# Patient Record
Sex: FEMALE | Race: BLACK OR AFRICAN AMERICAN | NOT HISPANIC OR LATINO | Employment: FULL TIME | URBAN - METROPOLITAN AREA
[De-identification: names, ages, dates, MRNs, and addresses within clinical notes are randomized per-mention and may not be internally consistent; named-entity substitution may affect disease eponyms.]

---

## 2017-01-03 ENCOUNTER — CLINICAL SUPPORT (OUTPATIENT)
Dept: SMOKING CESSATION | Facility: CLINIC | Age: 57
End: 2017-01-03
Payer: COMMERCIAL

## 2017-01-03 VITALS
HEIGHT: 67 IN | HEART RATE: 89 BPM | SYSTOLIC BLOOD PRESSURE: 128 MMHG | WEIGHT: 215.81 LBS | DIASTOLIC BLOOD PRESSURE: 84 MMHG | BODY MASS INDEX: 33.87 KG/M2

## 2017-01-03 DIAGNOSIS — F17.200 SMOKES AND MOTIVATED TO QUIT: Primary | ICD-10-CM

## 2017-01-03 PROCEDURE — 99404 PREV MED CNSL INDIV APPRX 60: CPT | Mod: S$GLB,,,

## 2017-01-03 RX ORDER — INSULIN DETEMIR 100 [IU]/ML
INJECTION, SOLUTION SUBCUTANEOUS
Refills: 10 | COMMUNITY
Start: 2016-11-17

## 2017-01-03 RX ORDER — LISINOPRIL 40 MG/1
40 TABLET ORAL DAILY
Refills: 3 | COMMUNITY
Start: 2016-12-14

## 2017-01-03 RX ORDER — LORATADINE 10 MG/1
10 TABLET ORAL DAILY
COMMUNITY

## 2017-01-03 RX ORDER — AMLODIPINE BESYLATE 10 MG/1
10 TABLET ORAL DAILY
Refills: 3 | COMMUNITY
Start: 2016-11-10

## 2017-01-03 RX ORDER — CARVEDILOL 25 MG/1
25 TABLET ORAL 2 TIMES DAILY WITH MEALS
Refills: 3 | COMMUNITY
Start: 2016-12-02

## 2017-01-03 RX ORDER — ATORVASTATIN CALCIUM 40 MG/1
40 TABLET, FILM COATED ORAL DAILY
Refills: 3 | COMMUNITY
Start: 2016-12-02

## 2017-01-03 RX ORDER — INSULIN LISPRO 100 [IU]/ML
INJECTION, SOLUTION INTRAVENOUS; SUBCUTANEOUS
Refills: 11 | COMMUNITY
Start: 2016-11-08

## 2017-01-03 RX ORDER — METFORMIN HYDROCHLORIDE 1000 MG/1
1000 TABLET ORAL 2 TIMES DAILY
Refills: 3 | COMMUNITY
Start: 2016-12-14

## 2017-01-03 RX ORDER — ESOMEPRAZOLE MAGNESIUM 40 MG/1
CAPSULE, DELAYED RELEASE ORAL
Refills: 3 | COMMUNITY
Start: 2016-10-21

## 2017-01-03 RX ORDER — VITAMIN E 268 MG
400 CAPSULE ORAL 2 TIMES DAILY
COMMUNITY

## 2017-01-04 RX ORDER — IBUPROFEN 200 MG
1 TABLET ORAL DAILY
Qty: 28 PATCH | Refills: 0 | Status: SHIPPED | OUTPATIENT
Start: 2017-01-04

## 2017-01-04 NOTE — PROGRESS NOTES
1/3/17   See Smoking Cessation Smart Form    Additional Comments:  CESD = 34 suggesting significant depression; patient acknowledged depression, denied unsafe thoughts toward self or others, future oriented, open to and was given resources to initiate mental health services. Has had mental health services in the past but said she can't afford to go to the same place.  One of the factors she said was impacting her mental health was the fact that she socially isolates because of the smell of smoke on her.  She will be participating in the Smoking Cessation Group starting 1/9/17.    Additional Interventions:  · Recommended patient participate in Smoking Cessation Group .  · Discussed triggers and planning for quit date.  · Given patient education handouts from [employer] Tool Kit #3  · Educated patient about and gave patient education handouts from  [employer] Drug Information on: NRT  · Provided phone number to reach [facility] CTTS (Certified Tobacco Treatment Specialist) for future assistance and numbers to 24/7 Quit lines.

## 2017-01-10 ENCOUNTER — CLINICAL SUPPORT (OUTPATIENT)
Dept: SMOKING CESSATION | Facility: CLINIC | Age: 57
End: 2017-01-10
Payer: COMMERCIAL

## 2017-01-10 DIAGNOSIS — F17.210 CIGARETTE SMOKER: Primary | ICD-10-CM

## 2017-01-10 PROCEDURE — 99407 BEHAV CHNG SMOKING > 10 MIN: CPT | Mod: S$GLB,,,

## 2017-01-16 ENCOUNTER — CLINICAL SUPPORT (OUTPATIENT)
Dept: SMOKING CESSATION | Facility: CLINIC | Age: 57
End: 2017-01-16
Payer: COMMERCIAL

## 2017-01-16 DIAGNOSIS — F17.210 CIGARETTE SMOKER: Primary | ICD-10-CM

## 2017-01-16 PROCEDURE — 90853 GROUP PSYCHOTHERAPY: CPT | Mod: S$GLB,,,

## 2017-01-17 NOTE — PROGRESS NOTES
Smoking Cessation Group Session #2    Site: [facility]  Date:  1/16/17  Clinical Status of Patient: Outpatient   Length of Service and Code: 90 minutes - 32365   Number in Attendance: 8  Group Activities/Focus of Group:  Sharing last weeks challenges, triggers, and coping activities to remain quit and/ or keep making progress toward cessation, completion of TCRS (Tobacco Cessation Rating Scale) learned addiction model, personal reasons for quitting, medications, goals, quit date.  Specific session focus: Dependence, withdrawal medications, effects of nicotine on the body, toxins in cigarettes, health risks of smoking, health improvements with quitting, states of mind and effect on urges.  Moving through urges without fighting them or smoking by mindful awareness.  Target symptoms:  withdrawal and medication side effects             The following were rated moderate (3) to severe (4) on TCRS:       Moderate 3: sadness & urges     Severe 4:   none  Patient's Response to Intervention: Active participation, self-disclosure, supportive of group peers.  Progress Toward Goals and Other Mental Status Changes:  Patient cut smoking behavior down by 50% , using nicotine patch and nasal spray.  Remained highly motivated to completely quit and identified a planned quit date.  Diagnosis: F17.210  Plan: Group therapy, individual support/cessation counseling and medication monitoring by CTTS. Medication management by providers.  Return to Clinic: 1 week  Planned Quit Date: 1/20/17

## 2017-01-23 ENCOUNTER — CLINICAL SUPPORT (OUTPATIENT)
Dept: SMOKING CESSATION | Facility: CLINIC | Age: 57
End: 2017-01-23
Payer: COMMERCIAL

## 2017-01-23 DIAGNOSIS — F17.210 CIGARETTE SMOKER: Primary | ICD-10-CM

## 2017-01-23 PROCEDURE — 90853 GROUP PSYCHOTHERAPY: CPT | Mod: S$GLB,,,

## 2017-01-24 RX ORDER — ASPIRIN/CALCIUM CARB/MAGNESIUM 325 MG
TABLET ORAL
Qty: 168 LOZENGE | Refills: 0 | Status: SHIPPED | OUTPATIENT
Start: 2017-01-24

## 2017-01-24 NOTE — PROGRESS NOTES
Smoking Cessation Group Session #3    Site: [facility]  Date:  1/23/17  Clinical Status of Patient: Outpatient   Length of Service and Code: 90 minutes - 12033   Number in Attendance: 5  Group Activities/Focus of Group:  Sharing last weeks challenges, triggers, and coping activities to remain quit and/ or keep making progress toward cessation, completion of TCRS (Tobacco Cessation Rating Scale) learned addiction model, personal reasons for quitting, medications, goals, quit date.  Specific session focus: Breaking the tobacco chain, willpower, medication, body triggers vulnerability factors & action plans.  Target symptoms:  withdrawal and medication side effects             The following were rated moderate (3) to severe (4) on TCRS:       Moderate 3: none     Severe 4:   none  Patient's Response to Intervention: Active participation, self-disclosure, supportive of group peers.  Progress Toward Goals and Other Mental Status Changes:   Still smoking 20 cigarettes/day, using patch and nasal spray.  Nasal spray making her nose sore, requested an Rx of nicotine lozenges.  Patient is reluctant to set a planned quit date.  We processed fear of quitting in group.  Diagnosis: F17.210  Plan: Group therapy, individual support/cessation counseling and medication monitoring by CTTS. Medication management by providers.  Return to Clinic: 1 week  Planned Quit Date: To be determined.

## 2017-01-30 ENCOUNTER — CLINICAL SUPPORT (OUTPATIENT)
Dept: SMOKING CESSATION | Facility: CLINIC | Age: 57
End: 2017-01-30
Payer: COMMERCIAL

## 2017-01-30 DIAGNOSIS — F17.210 CIGARETTE SMOKER: Primary | ICD-10-CM

## 2017-01-30 PROCEDURE — 90853 GROUP PSYCHOTHERAPY: CPT | Mod: S$GLB,,,

## 2017-02-01 NOTE — PROGRESS NOTES
Smoking Cessation Group Session #4    [facility]: [facility]  Date:  1/30/17  Clinical Status of Patient: Outpatient   Length of Service and Code: 90 minutes - 12593   Number in Attendance: 5  Group Activities/Focus of Group:  Sharing last weeks challenges, triggers, and coping activities to remain quit and/ or keep making progress toward cessation, completion of TCRS (Tobacco Cessation Rating Scale) learned addiction model, personal reasons for quitting, medications, goals, quit date.  Specific session focus: Dependence, withdrawal medications, effects of nicotine on the body, toxins in cigarettes, health risks of smoking, health improvements with quitting, states of mind and effect on urges.  Moving through urges without fighting them or smoking by mindful awareness.  Target symptoms:  withdrawal and medication side effects             The following were rated moderate (3) to severe (4) on TCRS:       Moderate 3: none     Severe 4:   noone  Patient's Response to Intervention: Active participation, self-disclosure, supportive of group peers.  Progress Toward Goals and Other Mental Status Changes:  Patient's quit date was yesterday and so far she has remained smoke free.  She shared with the group that it has not been as hard as she thought it was going to be.  She is pleased with her progress so far and mindful of triggers.  Diagnosis: F17.210  Plan: Group therapy, individual support/cessation counseling and medication monitoring by CTTS. Medication management by providers.  Return to Clinic: 1 week  Quit Date: 1/29/17

## 2017-02-06 ENCOUNTER — CLINICAL SUPPORT (OUTPATIENT)
Dept: SMOKING CESSATION | Facility: CLINIC | Age: 57
End: 2017-02-06
Payer: COMMERCIAL

## 2017-02-06 DIAGNOSIS — F17.210 CIGARETTE SMOKER: Primary | ICD-10-CM

## 2017-02-06 PROCEDURE — 90853 GROUP PSYCHOTHERAPY: CPT | Mod: S$GLB,,,

## 2017-02-13 ENCOUNTER — CLINICAL SUPPORT (OUTPATIENT)
Dept: SMOKING CESSATION | Facility: CLINIC | Age: 57
End: 2017-02-13
Payer: COMMERCIAL

## 2017-02-13 DIAGNOSIS — F17.210 CIGARETTE SMOKER: Primary | ICD-10-CM

## 2017-02-13 PROCEDURE — 90853 GROUP PSYCHOTHERAPY: CPT | Mod: S$GLB,,,

## 2017-02-13 NOTE — PROGRESS NOTES
Smoking Cessation Group Session #5    Site: [facility]  Date:  2/6/17  Clinical Status of Patient: Outpatient   Length of Service and Code: 90 minutes - 05559   Number in Attendance: 3  Group Activities/Focus of Group:  Sharing last weeks challenges, triggers, and coping activities to remain quit and/ or keep making progress toward cessation, completion of TCRS (Tobacco Cessation Rating Scale) learned addiction model, personal reasons for quitting, medications, goals, quit date.  Specific session focus: Triggers, tests, teachers related to the mind, managing Emotions and making action plans.  Target symptoms:  withdrawal and medication side effects             The following were rated moderate (3) to severe (4) on TCRS:       Moderate 3: none     Severe 4:   none  Patient's Response to Intervention: Active participation, self-disclosure, supportive of group peers.  Progress Toward Goals and Other Mental Status Changes: Patient has remained smoke free since her quit date without slips.  She verbalized to the group high motivation and intent to remain quit.  She is only using the nasal spray at this time.  Diagnosis: F17.210  Plan: Group therapy, individual support/cessation counseling and medication monitoring by CTTS. Medication management by providers.  Return to Clinic: 1 week  Quit Date: 1/29/17

## 2017-02-15 NOTE — PROGRESS NOTES
Smoking Cessation Group Session #6    [facility]: [facility]  Date:  2/13/17  Clinical Status of Patient: Outpatient   Length of Service and Code: 90 minutes - 31657   Number in Attendance: 4  Group Activities/Focus of Group:  Sharing last weeks challenges, triggers, and coping activities to remain quit and/ or keep making progress toward cessation, completion of TCRS (Tobacco Cessation Rating Scale) learned addiction model, personal reasons for quitting, medications, goals, quit date.  Specific session focus: Environmental triggers and managing environmental risks, using effective relationship strategies, relapse prevention & tips for getting back on track after a slip or relapse, wrap up summary of skills for maintaining quit.  Target symptoms:  withdrawal and medication side effects             The following were rated moderate (3) to severe (4) on TCRS:       Moderate 3: none     Severe 4:   none  Patient's Response to Intervention: Active participation, self-disclosure, supportive of group peers.  Progress Toward Goals and Other Mental Status Changes: Patient has remained smoke free since quit date.  Expressed high motivation and determination to remain quit.  Diagnosis: F17.210  Plan:           Use smoking cessation aids as prescribed,   Call 24 hour quit lines and CTTS as needed for support of continued cessation.   Follow through with plan to move through triggers and urges without smoking.   Maintain regular follow up with [facility].  Return to Clinic: 3/6/17  Quit Date:  1/29/17

## 2017-02-27 ENCOUNTER — TELEPHONE (OUTPATIENT)
Dept: SMOKING CESSATION | Facility: CLINIC | Age: 57
End: 2017-02-27

## 2017-02-27 NOTE — TELEPHONE ENCOUNTER
2/27/17   2:40 a.m.    Telephone call to patient to follow up on smoking cessation progress and intent to return to Smoking Cessation Group.  Left a voice mail message for a return call.

## 2017-05-24 ENCOUNTER — TELEPHONE (OUTPATIENT)
Dept: SMOKING CESSATION | Facility: CLINIC | Age: 57
End: 2017-05-24

## 2017-05-26 ENCOUNTER — TELEPHONE (OUTPATIENT)
Dept: SMOKING CESSATION | Facility: CLINIC | Age: 57
End: 2017-05-26

## 2017-05-31 ENCOUNTER — TELEPHONE (OUTPATIENT)
Dept: SMOKING CESSATION | Facility: CLINIC | Age: 57
End: 2017-05-31

## 2018-01-02 ENCOUNTER — TELEPHONE (OUTPATIENT)
Dept: SMOKING CESSATION | Facility: CLINIC | Age: 58
End: 2018-01-02

## 2018-01-15 ENCOUNTER — TELEPHONE (OUTPATIENT)
Dept: SMOKING CESSATION | Facility: CLINIC | Age: 58
End: 2018-01-15

## 2018-01-30 ENCOUNTER — TELEPHONE (OUTPATIENT)
Dept: SMOKING CESSATION | Facility: CLINIC | Age: 58
End: 2018-01-30

## 2018-03-27 ENCOUNTER — TELEPHONE (OUTPATIENT)
Dept: SMOKING CESSATION | Facility: CLINIC | Age: 58
End: 2018-03-27

## 2022-10-31 DIAGNOSIS — M25.552 LEFT HIP PAIN: Primary | ICD-10-CM

## 2022-11-01 ENCOUNTER — HOSPITAL ENCOUNTER (OUTPATIENT)
Dept: RADIOLOGY | Facility: HOSPITAL | Age: 62
Discharge: HOME | End: 2022-11-01
Attending: PHYSICIAN ASSISTANT
Payer: MEDICAID

## 2022-11-01 ENCOUNTER — OFFICE VISIT (OUTPATIENT)
Dept: ORTHOPEDICS | Facility: CLINIC | Age: 62
End: 2022-11-01
Payer: MEDICAID

## 2022-11-01 VITALS
BODY MASS INDEX: 34.05 KG/M2 | HEART RATE: 93 BPM | DIASTOLIC BLOOD PRESSURE: 77 MMHG | WEIGHT: 216.94 LBS | HEIGHT: 67 IN | SYSTOLIC BLOOD PRESSURE: 147 MMHG

## 2022-11-01 DIAGNOSIS — M70.62 TROCHANTERIC BURSITIS OF LEFT HIP: Primary | ICD-10-CM

## 2022-11-01 DIAGNOSIS — M25.552 LEFT HIP PAIN: ICD-10-CM

## 2022-11-01 DIAGNOSIS — M54.32 SCIATICA OF LEFT SIDE: ICD-10-CM

## 2022-11-01 PROCEDURE — 99204 PR OFFICE/OUTPT VISIT, NEW, LEVL IV, 45-59 MIN: ICD-10-PCS | Mod: 25,S$PBB,, | Performed by: PHYSICIAN ASSISTANT

## 2022-11-01 PROCEDURE — 73502 X-RAY EXAM HIP UNI 2-3 VIEWS: CPT | Mod: TC,FY,LT

## 2022-11-01 PROCEDURE — 20610 DRAIN/INJ JOINT/BURSA W/O US: CPT | Mod: PBBFAC,PN | Performed by: PHYSICIAN ASSISTANT

## 2022-11-01 PROCEDURE — 73502 XR HIP WITH PELVIS WHEN PERFORMED, 2 OR 3 VIEWS LEFT: ICD-10-PCS | Mod: 26,LT,, | Performed by: RADIOLOGY

## 2022-11-01 PROCEDURE — 4010F PR ACE/ARB THEARPY RXD/TAKEN: ICD-10-PCS | Mod: CPTII,,, | Performed by: PHYSICIAN ASSISTANT

## 2022-11-01 PROCEDURE — 99999 PR PBB SHADOW E&M-EST. PATIENT-LVL IV: CPT | Mod: PBBFAC,,, | Performed by: PHYSICIAN ASSISTANT

## 2022-11-01 PROCEDURE — 3078F PR MOST RECENT DIASTOLIC BLOOD PRESSURE < 80 MM HG: ICD-10-PCS | Mod: CPTII,,, | Performed by: PHYSICIAN ASSISTANT

## 2022-11-01 PROCEDURE — 3008F PR BODY MASS INDEX (BMI) DOCUMENTED: ICD-10-PCS | Mod: CPTII,,, | Performed by: PHYSICIAN ASSISTANT

## 2022-11-01 PROCEDURE — 1159F MED LIST DOCD IN RCRD: CPT | Mod: CPTII,,, | Performed by: PHYSICIAN ASSISTANT

## 2022-11-01 PROCEDURE — 3078F DIAST BP <80 MM HG: CPT | Mod: CPTII,,, | Performed by: PHYSICIAN ASSISTANT

## 2022-11-01 PROCEDURE — 3077F SYST BP >= 140 MM HG: CPT | Mod: CPTII,,, | Performed by: PHYSICIAN ASSISTANT

## 2022-11-01 PROCEDURE — 73502 X-RAY EXAM HIP UNI 2-3 VIEWS: CPT | Mod: 26,LT,, | Performed by: RADIOLOGY

## 2022-11-01 PROCEDURE — 4010F ACE/ARB THERAPY RXD/TAKEN: CPT | Mod: CPTII,,, | Performed by: PHYSICIAN ASSISTANT

## 2022-11-01 PROCEDURE — 99204 OFFICE O/P NEW MOD 45 MIN: CPT | Mod: 25,S$PBB,, | Performed by: PHYSICIAN ASSISTANT

## 2022-11-01 PROCEDURE — 20610 LARGE JOINT ASPIRATION/INJECTION: L GREATER TROCHANTERIC BURSA: ICD-10-PCS | Mod: S$PBB,LT,, | Performed by: PHYSICIAN ASSISTANT

## 2022-11-01 PROCEDURE — 99214 OFFICE O/P EST MOD 30 MIN: CPT | Mod: PBBFAC,PN | Performed by: PHYSICIAN ASSISTANT

## 2022-11-01 PROCEDURE — 99999 PR PBB SHADOW E&M-EST. PATIENT-LVL IV: ICD-10-PCS | Mod: PBBFAC,,, | Performed by: PHYSICIAN ASSISTANT

## 2022-11-01 PROCEDURE — 3008F BODY MASS INDEX DOCD: CPT | Mod: CPTII,,, | Performed by: PHYSICIAN ASSISTANT

## 2022-11-01 PROCEDURE — 1159F PR MEDICATION LIST DOCUMENTED IN MEDICAL RECORD: ICD-10-PCS | Mod: CPTII,,, | Performed by: PHYSICIAN ASSISTANT

## 2022-11-01 PROCEDURE — 3077F PR MOST RECENT SYSTOLIC BLOOD PRESSURE >= 140 MM HG: ICD-10-PCS | Mod: CPTII,,, | Performed by: PHYSICIAN ASSISTANT

## 2022-11-01 PROCEDURE — 20610 DRAIN/INJ JOINT/BURSA W/O US: CPT | Mod: S$PBB,LT,, | Performed by: PHYSICIAN ASSISTANT

## 2022-11-01 RX ORDER — TRIAMCINOLONE ACETONIDE 40 MG/ML
40 INJECTION, SUSPENSION INTRA-ARTICULAR; INTRAMUSCULAR
Status: DISCONTINUED | OUTPATIENT
Start: 2022-11-01 | End: 2022-11-01 | Stop reason: HOSPADM

## 2022-11-01 RX ADMIN — TRIAMCINOLONE ACETONIDE 40 MG: 40 INJECTION, SUSPENSION INTRA-ARTICULAR; INTRAMUSCULAR at 01:11

## 2022-11-01 NOTE — PROGRESS NOTES
"Subjective:      Patient ID: Isamar Ibrahim is a 62 y.o. female.    Chief Complaint: Pain of the Left Hip      61yo obese female with DM2  presents with many year history of left hip and buttocks pain. Has history of sciatica. She has constant lateral hip pain. Her buttocks pain is occasionally but is associated with sharp, shooting pain down the leg with burning/numbness sensation. This pain "comes and goes." Worse with prolonged ambulation. Notes change in her balance due to her symptoms. Notes instability in her leg. Taking tylenol as needed. Has completed PT two years ago.      Review of Systems   Constitutional: Negative for chills and fever.   Cardiovascular:  Negative for chest pain.   Respiratory:  Negative for cough.    Hematologic/Lymphatic: Does not bruise/bleed easily.   Skin:  Negative for poor wound healing and rash.   Musculoskeletal:  Positive for joint pain, myalgias and stiffness. Negative for back pain.   Gastrointestinal:  Negative for abdominal pain.   Genitourinary:  Negative for bladder incontinence.   Neurological:  Negative for dizziness, loss of balance and weakness.   Psychiatric/Behavioral:  Negative for altered mental status.      Review of patient's allergies indicates:   Allergen Reactions    Penicillins Shortness Of Breath        Current Outpatient Medications   Medication Sig Dispense Refill    amlodipine (NORVASC) 10 MG tablet Take 10 mg by mouth once daily.  3    atorvastatin (LIPITOR) 40 MG tablet Take 40 mg by mouth once daily.  3    CALCIUM-MAGNESIUM-ZINC ORAL Take by mouth.      carvedilol (COREG) 25 MG tablet Take 25 mg by mouth 2 (two) times daily with meals.  3    HUMALOG 100 unit/mL injection INJECT 5 UNITS SC BID BEFORE MEALS  11    LACTOBACILLUS ACIDOPHILUS (PROBIOTIC ORAL) Take 1 capsule by mouth once daily.      LEVEMIR FLEXTOUCH 100 unit/mL (3 mL) InPn pen INJECT 70 UNITS SC AT NIGHT  10    lisinopril (PRINIVIL,ZESTRIL) 40 MG tablet Take 40 mg by mouth once daily.  " "3    loratadine (CLARITIN) 10 mg tablet Take 10 mg by mouth once daily.      metformin (GLUCOPHAGE) 1000 MG tablet Take 1,000 mg by mouth 2 (two) times daily.  3    NEXIUM 40 mg capsule TK 1 C PO QAM B ABEBE  3    nicotine (NICODERM CQ) 21 mg/24 hr Place 1 patch onto the skin once daily. 28 patch 0    nicotine 10 mg/mL Spry One dose (one spray to each nostril) as needed , max 5 in 1 hour period. 40 mL 0    nicotine polacrilex 4 MG Lozg Nicorette Lozenge -One 4 mg lozenge by mouth as needed, max 5 in 6 hour period. 168 lozenge 0    vitamin E 400 UNIT capsule Take 400 Units by mouth 2 (two) times daily.       No current facility-administered medications for this visit.        The patient's relevant past medical, surgical, and social history was reviewed in Epic.       Objective:      VITAL SIGNS: BP (!) 147/77   Pulse 93   Ht 5' 7" (1.702 m)   Wt 98.4 kg (216 lb 14.9 oz)   BMI 33.98 kg/m²     General    Nursing note and vitals reviewed.  Constitutional: She is oriented to person, place, and time. She appears well-developed and well-nourished.   Neurological: She is alert and oriented to person, place, and time.     General Musculoskeletal Exam   Gait: antalgic     Left Hip Exam     Tenderness   The patient tender to palpation of the trochanteric bursa.    Range of Motion   Abduction:  normal   Adduction:  normal   Extension:  normal   Flexion:  normal   External rotation:  normal   Internal rotation: normal     Tests   Pain w/ forced internal rotation (CRYSTAL): absent  Pain w/ forced external rotation (FADIR): absent  Stinchfield test: negative    Other   Sensation: normal    Comments:  Ttp deep buttocks. Negative SLR           Muscle Strength   Left Lower Extremity   Hip Abduction: 5/5   Hip Adduction: 5/5   Hip Flexion: 5/5   Ankle Dorsiflexion:  5/5     Vascular Exam       Left Pulses  Dorsalis Pedis:      2+  Posterior Tibial:      1+     X-Ray Hip 2 or 3 views Left (with Pelvis when performed)  Narrative: " EXAMINATION:  XR HIP WITH PELVIS WHEN PERFORMED, 2 OR 3 VIEWS LEFT    CLINICAL HISTORY:  Pain in left hip    TECHNIQUE:  XR HIP WITH PELVIS WHEN PERFORMED, 2 OR 3 VIEWS LEFT    COMPARISON:  None    FINDINGS:  No bone, joint, or soft tissue abnormality is seen.  Impression: Normal    Electronically signed by: Mahamed Smith Jr  Date:    11/01/2022  Time:    15:29    I have reviewed the above radiograph and agree with the findings stated by the radiologist.           Assessment:       1. Trochanteric bursitis of left hip    2. Sciatica of left side          Plan:         Isamar was seen today for pain.    Diagnoses and all orders for this visit:    Trochanteric bursitis of left hip  -     Ambulatory referral/consult to Physical/Occupational Therapy; Future  -     Large Joint Aspiration/Injection: L greater trochanteric bursa    Sciatica of left side  -     Ambulatory referral/consult to Physical/Occupational Therapy; Future    Left hip greater troch bursitis as well as chronic left sided sciatica. Explained to the patient her symptoms are multi factorial. The constant pain is due to the bursitis. The occasional shooting pain is due to chronic sciatica. I will inject the left greater troch bursa today to help with that pain. Will refer to PT for bursitis and sciatica. If sciatica pain does not resolve or improve, explained she will need to follow up with a spine specialist. She understands and agrees with this plan.      Diagnoses and plan discussed with the patient, as well as the expected course and duration of his symptoms.  All questions and concerns were addressed prior to the end of the visit.   Instructed patient to call office if they have any future questions/concerns or to schedule apt. Patient will return to see me if symptoms worsen or fail to improve    Note dictated with voice recognition software, please excuse any grammatical errors.        Roxie Tanner PA-C   11/01/2022

## 2022-11-01 NOTE — PROCEDURES
Large Joint Aspiration/Injection: L greater trochanteric bursa    Date/Time: 11/1/2022 1:45 PM  Performed by: Roxie Tanner PA-C  Authorized by: Roxie Tanner PA-C     Consent Done?:  Yes (Verbal)  Indications:  Pain  Site marked: the procedure site was marked    Timeout: prior to procedure the correct patient, procedure, and site was verified    Prep: patient was prepped and draped in usual sterile fashion      Local anesthesia used?: Yes    Local anesthetic:  Topical anesthetic    Details:  Needle Size:  22 G  Approach:  Lateral  Location:  Hip  Site:  L greater trochanteric bursa  Medications:  40 mg triamcinolone acetonide 40 mg/mL  Patient tolerance:  Patient tolerated the procedure well with no immediate complications

## 2022-11-03 ENCOUNTER — CLINICAL SUPPORT (OUTPATIENT)
Dept: REHABILITATION | Facility: HOSPITAL | Age: 62
End: 2022-11-03
Payer: MEDICAID

## 2022-11-03 DIAGNOSIS — M54.32 SCIATICA OF LEFT SIDE: ICD-10-CM

## 2022-11-03 DIAGNOSIS — G89.29 CHRONIC HIP PAIN, BILATERAL: ICD-10-CM

## 2022-11-03 DIAGNOSIS — G89.29 CHRONIC BILATERAL LOW BACK PAIN WITHOUT SCIATICA: ICD-10-CM

## 2022-11-03 DIAGNOSIS — M25.551 CHRONIC HIP PAIN, BILATERAL: ICD-10-CM

## 2022-11-03 DIAGNOSIS — M70.62 TROCHANTERIC BURSITIS OF LEFT HIP: ICD-10-CM

## 2022-11-03 DIAGNOSIS — R53.1 DECREASED STRENGTH: ICD-10-CM

## 2022-11-03 DIAGNOSIS — M54.50 CHRONIC BILATERAL LOW BACK PAIN WITHOUT SCIATICA: ICD-10-CM

## 2022-11-03 DIAGNOSIS — M25.552 CHRONIC HIP PAIN, BILATERAL: ICD-10-CM

## 2022-11-03 PROCEDURE — 97110 THERAPEUTIC EXERCISES: CPT

## 2022-11-03 PROCEDURE — 97161 PT EVAL LOW COMPLEX 20 MIN: CPT

## 2022-11-03 NOTE — PLAN OF CARE
[facility]   Physical Therapy Initial Evaluation     Date: 11/3/2022   Name: Isamar Ibrahim  Clinic Number: [id number]    Therapy Diagnosis: No diagnosis found.  Physician: Roxie Tanner PA-C    Physician Orders: PT Eval and Treat   Medical Diagnosis from Referral:   M70.62 (ICD-10-CM) - Trochanteric bursitis of left hip   M54.32 (ICD-10-CM) - Sciatica of left side     Evaluation Date: 11/3/2022  Authorization Period Expiration: 11/01/23  Plan of Care Expiration: 3/1/23  Progress Note Due: 12/3/22  Visit # / Visits authorized: 1/ 1   FOTO: 60% limitation  FOTO 1st Follow Up:  FOTO 2nd Follow Up:      Precautions: Standard, Diabetes, and Fall     Time In: 9:17  Time Out: 10:00  Total Appointment Time (timed & untimed codes): 43 minutes      SUBJECTIVE     Date of onset: 10 years    History of current condition - Isamar reports: having chronic otto hip pain that started 10 years ago. She reported the pain is worse on left and mostly affect her with ambulating and cant go further than 1 block. She also reports getting numbness or tingling in otto legs on the outside. She had previous history of low back pain and scaitica that went down into both legs into ankles. She received an injection yesterday and has been feeling so much better. She has been able to walk better and not have any pain with ambulating. She still is getting some pain on the outside of her hips during sitting. Does report some numbness into anterior hip currently.     Falls: 2 months ago, usually on left hip    Imaging, x-ray - FINDINGS:  No bone, joint, or soft tissue abnormality is seen.    Prior Therapy: none  Occupation: retired  Prior Level of Function: independent  Current Level of Function: pain with ambulation, standing for prolonged periods.     Pain:  Current 4/10, worst 10/10, best 0/10   Location: left and bilateral back , groin , and hips    Description: Aching, Dull, and Numb  Aggravating Factors: Sitting,  Standing, and Walking  Easing Factors: pain medication, ice, and rest    Patients goals: be able to walk without pain, ADL's and everyday activities.      Medical History:   Past Medical History:   Diagnosis Date    Allergy     Environmental    Anxiety     2007    Cataract     Starting in both eyes    Diabetes mellitus, type 2     Disorder of kidney and ureter     hx of kidney stones    GERD (gastroesophageal reflux disease)     Heart murmur     Hypertension     Thyroid nodule        Surgical History:   Isamar Ibrahim  has a past surgical history that includes Kidney stone surgery; Hysterectomy (03/1979); Tonsillectomy (1983); and Breast surgery (10/1988).    Medications:   Isamar has a current medication list which includes the following prescription(s): amlodipine, atorvastatin, calcium/magnesium/zinc, carvedilol, humalog u-100 insulin, lactobacillus acidophilus, levemir flextouch u-100 insuln, lisinopril, loratadine, metformin, nexium, nicotine, nicotine, nicotine polacrilex, and vitamin e.    Allergies:   Review of patient's allergies indicates:   Allergen Reactions    Penicillins Shortness Of Breath          OBJECTIVE     Observation: pt was pleasant throughout treatment session. Ambulation with left antalgic gait, decreased hip ext left>right.     Posture:  right pelvis elevation. Increased lumbar lordosis, increased thoracic kyphosis.     Lumbar Range of Motion:    Limitations Pain   Flexion 15%   none     Extension 40%   none     Left Side Bending 25% Some on left side     Right Side Bending 25% Some on left side         Hip Passive range of motion:   Hip Right Left   Flex 100 100   Ext -5 (lacking)* 0   IR 25 15   ER 70 65         Lower Extremity Strength  Right LE  Left LE    Quadriceps: 4/5 Quadriceps: 4/5   Hamstrings: NT Hamstrings: NT   Iliospoas: NT Iliospoas: NT   Hip extension:  3-/5* Hip extension: 3-/5   HIP abd: 3+/5 Hip abd: 3-/5   Hip ER:  3+/5 Hip ER: 3+/5   Hip IR: 4/5 Hip IR: 4-/5  "  Ankle dorsiflexion: NT Ankle dorsiflexion: NT   Ankle plantarflexion: NT Ankle plantarflexion: NT     Sensation: intact light touch otto LE    Reflexes: NT due to time  -Patellar (L3-L4):   -Achilles (S1):     Special Tests:  -SLR Test: negative  -Repeated Flexion: NT  -Repeated Ext: NT  -Prone Instability Test: NT  -Bridge Test: nt  -Slump Test: NT    SI Special Tests:   Distraction: positive  Compression: negative  SI thigh thrust: NT  Sacral thrust: positive  Flick test: NT  Fortins sign: positive on right   Supine to site: right long to short    Joint Mobility: decreased left hip mobility   -Shear testing:  -Segmental mobility testing:    Palpation: TTP lateral hip, PSIS right>left         Limitation/Restriction for FOTO hip Survey    Therapist reviewed FOTO scores for Isamar Ibrahim on 11/3/2022.   FOTO documents entered into [employer] - see Media section.    Limitation Score: 60%         TREATMENT     Total Treatment time (time-based codes) separate from Evaluation: 14 minutes      Isamar received the treatments listed below:      therapeutic exercises to develop strength and endurance for 9 minutes including:  Sidelying hip ER 2x15 each  Glute sets 10x10"  Supine clams with red TB 10x10"    manual therapy techniques: Joint mobilizations were applied to the: hip, SI joint for 5 minutes, including:  Left hip long axis manipulation  SI gapping manipulation on right       PATIENT EDUCATION AND HOME EXERCISES     Education provided:   - HEP  - importance of hip strengthening    Written Home Exercises Provided: yes. Exercises were reviewed and Isamar was able to demonstrate them prior to the end of the session.  Isamar demonstrated fair understanding of the education provided. See EMR under Patient Instructions for exercises provided during therapy sessions.    ASSESSMENT     Isamar is a 62 y.o. female referred to outpatient Physical Therapy with a medical diagnosis of Trochanteric bursitis of left hip. Patient " presents with decreased hip range of motion, decreased LE strengthening, positive SI joint motion testing, abnormal gait, and pain that is affecting everyday activities. She is appropriate for skilled PT interventions to address deficits listed and improve overall functional mobility.     Patient prognosis is Good.   Patient will benefit from skilled outpatient Physical Therapy to address the deficits stated above and in the chart below, provide patient /family education, and to maximize patientt's level of independence.     Plan of care discussed with patient: Yes  Patient's spiritual, cultural and educational needs considered and patient is agreeable to the plan of care and goals as stated below:     Anticipated Barriers for therapy: none at this time    Medical Necessity is demonstrated by the following  History  Co-morbidities and personal factors that may impact the plan of care Co-morbidities:   advanced age, anxiety, and high BMI  Diabetes mellitus, type 2    Disorder of kidney and ureter    hx of kidney stones   GERD (gastroesophageal reflux disease)    Heart murmur    Hypertension      Personal Factors:   lifestyle     high   Examination  Body Structures and Functions, activity limitations and participation restrictions that may impact the plan of care Body Regions:   back  lower extremities    Body Systems:    gross symmetry  ROM  strength  gross coordinated movement  motor control  motor learning    Participation Restrictions:   None at this time    Activity limitations:   Learning and applying knowledge  no deficits    General Tasks and Commands  no deficits    Communication  no deficits    Mobility  walking    Self care  no deficits    Domestic Life  no deficits    Interactions/Relationships  no deficits    Life Areas  no deficits    Community and Social Life  no deficits         high   Clinical Presentation stable and uncomplicated low   Decision Making/ Complexity Score: low     GOALS: Short Term  Goals:  4-6 weeks  1.Report decreased hip pain  < / =  3/10  to increase tolerance for ambulation  2. Increase ROM by 5-10 degrees where limited in order to perform ADLs without difficulty.  3. Increase strength by 1/3 MMT grade in hip  to increase tolerance for ADL and work activities.  4. Pt to tolerate HEP to improve ROM and independence with ADL's    Long Term Goals: 8-12 weeks  1.Report decreased hip pain < / = 2/10  to increase tolerance for ambulation  2.Patient goal: be able to ambulate long distances without pain  3.Increase strength to 4/5 in  hip  to increase tolerance for ADL and work activities.  4. Pt will report at CJ level (20-40% impaired) on LEFS  to demonstrate increase in LE function with every day tasks.       PLAN   Plan of care Certification: 11/3/2022 to 3/1/23.    Outpatient Physical Therapy 1-2 times weekly for 12 weeks to include the following interventions: Gait Training, Manual Therapy, Moist Heat/ Ice, Neuromuscular Re-ed, Patient Education, Self Care, Therapeutic Activities, and Therapeutic Exercise.     Mayito Birmingham, PT      I CERTIFY THE NEED FOR THESE SERVICES FURNISHED UNDER THIS PLAN OF TREATMENT AND WHILE UNDER MY CARE   Physician's comments:     Physician's Signature: ___________________________________________________

## 2022-11-22 ENCOUNTER — CLINICAL SUPPORT (OUTPATIENT)
Dept: REHABILITATION | Facility: HOSPITAL | Age: 62
End: 2022-11-22
Payer: MEDICAID

## 2022-11-22 DIAGNOSIS — M54.50 CHRONIC BILATERAL LOW BACK PAIN WITHOUT SCIATICA: ICD-10-CM

## 2022-11-22 DIAGNOSIS — M25.551 CHRONIC HIP PAIN, BILATERAL: ICD-10-CM

## 2022-11-22 DIAGNOSIS — G89.29 CHRONIC HIP PAIN, BILATERAL: ICD-10-CM

## 2022-11-22 DIAGNOSIS — R53.1 DECREASED STRENGTH: Primary | ICD-10-CM

## 2022-11-22 DIAGNOSIS — M25.552 CHRONIC HIP PAIN, BILATERAL: ICD-10-CM

## 2022-11-22 DIAGNOSIS — G89.29 CHRONIC BILATERAL LOW BACK PAIN WITHOUT SCIATICA: ICD-10-CM

## 2022-11-22 PROCEDURE — 97110 THERAPEUTIC EXERCISES: CPT

## 2022-11-22 NOTE — PROGRESS NOTES
"    Physical Therapy Daily Treatment Note     Name: Isamar Ibrahim  Clinic Number: [id number]    Therapy Diagnosis: No diagnosis found.  Physician: Roxie Tanner PA-C    Visit Date: 11/22/2022  Physician Orders: PT Eval and Treat   Medical Diagnosis from Referral:   M70.62 (ICD-10-CM) - Trochanteric bursitis of left hip   M54.32 (ICD-10-CM) - Sciatica of left side      Evaluation Date: 11/3/2022  Authorization Period Expiration: 11/01/23  Plan of Care Expiration: 3/1/23  Progress Note Due: 12/3/22  Visit # / Visits authorized: 1/ 12  FOTO: 60% limitation  FOTO 1st Follow Up:  FOTO 2nd Follow Up:        Precautions: Standard, Diabetes, and Fall      Time In: 9:15  Time Out: 10:00  Total Billable Time: 30 minutes      Subjective     Pt reports: had a fall on Saturday on her left hip. Has been getting increased pain in her right side and still in her left. Denies any numbness or tingling just "feels like one leg is longer than the other".    She was compliant with home exercise program.  Response to previous treatment: n/a  Functional change: n/a    Pain: 4/10  Location: bilateral back  and hips      Objective     Ambulation: right antalgic gait, with ext rotation, right pelvic drop     Observation: right anterior pelvic rotation    Lumbar range of motion: WFL and pain free    Hip range of motion: decreased hip IR on right     **All exercises billed as therapeutic exercises per medicaid guidelines**      Isamar received therapeutic exercises to develop strength and endurance for 30 minutes including:    Sidelying hip ER 2x15 each  Glute sets 15x10"  Supine clams with red TB 10x10"  Clams green TB 2x12x5"  NuStep left level 1 for 10' for range of motion, strength and cardiovascular endurance    Isamar received the following manual therapy techniques: Joint mobilizations were applied to the: hip for 15 minutes, including:  Hip/lumbar assessment  Left/right hip long axis distraction  Right anterior innominate " "correction MET 3x6"    Not Performed  SI gapping manipulation on right       Home Exercises Provided and Patient Education Provided     Education provided:   - HEP  - importance of hip strengthening    Written Home Exercises Provided: yes.  Exercises were reviewed and Isamar was able to demonstrate them prior to the end of the session.  Isamar demonstrated good  understanding of the education provided.     See EMR under Patient Instructions for exercises provided prior visit.    Assessment      Isamar presented with decreased gait with increased antalgic gait and ext rotation. Also had an occasion of leg giving out on left due to the pain. Improved symptoms following manual interventions. Added NuStep today and she had positive relief of symptoms. Will continue to progress as tolerated.     Isamar Is progressing well towards her goals.   Pt prognosis is Good.     Pt will continue to benefit from skilled outpatient physical therapy to address the deficits listed in the problem list box on initial evaluation, provide pt/family education and to maximize pt's level of independence in the home and community environment.     Pt's spiritual, cultural and educational needs considered and pt agreeable to plan of care and goals.    Anticipated barriers to physical therapy: scheduling    GOALS: Short Term Goals:  4-6 weeks - progressing not met  1.Report decreased hip pain  < / =  3/10  to increase tolerance for ambulation  2. Increase ROM by 5-10 degrees where limited in order to perform ADLs without difficulty.  3. Increase strength by 1/3 MMT grade in hip  to increase tolerance for ADL and work activities.  4. Pt to tolerate HEP to improve ROM and independence with ADL's     Long Term Goals: 8-12 weeks - progressing not met  1.Report decreased hip pain < / = 2/10  to increase tolerance for ambulation  2.Patient goal: be able to ambulate long distances without pain  3.Increase strength to 4/5 in  hip  to increase tolerance " for ADL and work activities.  4. Pt will report at CJ level (20-40% impaired) on LEFS  to demonstrate increase in LE function with every day tasks.     Plan     Continue with POC and progression as tolerated.     Mayito Birmingham, PT

## 2022-12-06 ENCOUNTER — CLINICAL SUPPORT (OUTPATIENT)
Dept: REHABILITATION | Facility: HOSPITAL | Age: 62
End: 2022-12-06
Payer: MEDICAID

## 2022-12-06 DIAGNOSIS — M25.552 CHRONIC HIP PAIN, BILATERAL: ICD-10-CM

## 2022-12-06 DIAGNOSIS — R53.1 DECREASED STRENGTH: Primary | ICD-10-CM

## 2022-12-06 DIAGNOSIS — M25.551 CHRONIC HIP PAIN, BILATERAL: ICD-10-CM

## 2022-12-06 DIAGNOSIS — G89.29 CHRONIC HIP PAIN, BILATERAL: ICD-10-CM

## 2022-12-06 DIAGNOSIS — M54.50 CHRONIC BILATERAL LOW BACK PAIN WITHOUT SCIATICA: ICD-10-CM

## 2022-12-06 DIAGNOSIS — G89.29 CHRONIC BILATERAL LOW BACK PAIN WITHOUT SCIATICA: ICD-10-CM

## 2022-12-06 PROCEDURE — 97110 THERAPEUTIC EXERCISES: CPT

## 2022-12-06 NOTE — PROGRESS NOTES
"      Physical Therapy Daily Treatment Note     Name: Isamar Ibrahim  Clinic Number: [id number]    Therapy Diagnosis:   Encounter Diagnoses   Name Primary?    Decreased strength Yes    Chronic bilateral low back pain without sciatica     Chronic hip pain, bilateral      Physician: Roxie Tanner PA-C    Visit Date: 12/6/2022  Physician Orders: PT Eval and Treat   Medical Diagnosis from Referral:   M70.62 (ICD-10-CM) - Trochanteric bursitis of left hip   M54.32 (ICD-10-CM) - Sciatica of left side      Evaluation Date: 11/3/2022  Authorization Period Expiration: 11/01/23  Plan of Care Expiration: 3/1/23  Progress Note Due: 12/3/22  Visit # / Visits authorized: 2/ 12  FOTO: 60% limitation  FOTO 1st Follow Up:  FOTO 2nd Follow Up:        Precautions: Standard, Diabetes, and Fall      Time In: 9:00  Time Out: 10:00  Total Billable Time: 30 minutes      Subjective     Pt reports: doing better today with less pain in the back.     She was compliant with home exercise program.  Response to previous treatment: n/a  Functional change: n/a    Pain: 4/10  Location: bilateral back  and hips      Objective     Ambulation: right antalgic gait, with ext rotation, right pelvic drop     Observation: right anterior pelvic rotation    Lumbar range of motion: WFL and pain free    Hip range of motion: decreased hip IR on right     **All exercises billed as therapeutic exercises per medicaid guidelines**      Isamar received therapeutic exercises to develop strength and endurance for 30 minutes including:    Sidelying hip ER 2x15 each  Partial bridges with glute sets 20x5"  Supine clams with red TB 20x10"  Clams red TB 2x12x5"  Shuttle double leg press 50# 3x15  NuStep left level 1 for 10' for range of motion, strength and cardiovascular endurance    Isamar received the following manual therapy techniques: Joint mobilizations were applied to the: hip for 15 minutes, including:  Hip/lumbar assessment  Left/right hip long axis " "distraction Grades I-IV    Not Performed  Right anterior innominate correction MET 3x6"  SI gapping manipulation on right       Home Exercises Provided and Patient Education Provided     Education provided:   - HEP  - importance of hip strengthening    Written Home Exercises Provided: yes.  Exercises were reviewed and Isamar was able to demonstrate them prior to the end of the session.  Isamar demonstrated good  understanding of the education provided.     See EMR under Patient Instructions for exercises provided prior visit.    Assessment      Isamar presented with improved symptoms and decreased SI joint dysfunction today. Continued progression with exercises and strengthening/stability. Will continue to progress as tolerated.     Isamar Is progressing well towards her goals.   Pt prognosis is Good.     Pt will continue to benefit from skilled outpatient physical therapy to address the deficits listed in the problem list box on initial evaluation, provide pt/family education and to maximize pt's level of independence in the home and community environment.     Pt's spiritual, cultural and educational needs considered and pt agreeable to plan of care and goals.    Anticipated barriers to physical therapy: scheduling    GOALS: Short Term Goals:  4-6 weeks - progressing not met  1.Report decreased hip pain  < / =  3/10  to increase tolerance for ambulation  2. Increase ROM by 5-10 degrees where limited in order to perform ADLs without difficulty.  3. Increase strength by 1/3 MMT grade in hip  to increase tolerance for ADL and work activities.  4. Pt to tolerate HEP to improve ROM and independence with ADL's     Long Term Goals: 8-12 weeks - progressing not met  1.Report decreased hip pain < / = 2/10  to increase tolerance for ambulation  2.Patient goal: be able to ambulate long distances without pain  3.Increase strength to 4/5 in  hip  to increase tolerance for ADL and work activities.  4. Pt will report at CJ level " (20-40% impaired) on LEFS  to demonstrate increase in LE function with every day tasks.     Plan     Continue with POC and progression as tolerated.     Mayito Birmingham, PT

## 2022-12-08 ENCOUNTER — CLINICAL SUPPORT (OUTPATIENT)
Dept: REHABILITATION | Facility: HOSPITAL | Age: 62
End: 2022-12-08
Payer: MEDICAID

## 2022-12-08 DIAGNOSIS — R53.1 DECREASED STRENGTH: Primary | ICD-10-CM

## 2022-12-08 DIAGNOSIS — M25.552 CHRONIC HIP PAIN, BILATERAL: ICD-10-CM

## 2022-12-08 DIAGNOSIS — G89.29 CHRONIC BILATERAL LOW BACK PAIN WITHOUT SCIATICA: ICD-10-CM

## 2022-12-08 DIAGNOSIS — M25.551 CHRONIC HIP PAIN, BILATERAL: ICD-10-CM

## 2022-12-08 DIAGNOSIS — M54.50 CHRONIC BILATERAL LOW BACK PAIN WITHOUT SCIATICA: ICD-10-CM

## 2022-12-08 DIAGNOSIS — G89.29 CHRONIC HIP PAIN, BILATERAL: ICD-10-CM

## 2022-12-08 PROCEDURE — 97110 THERAPEUTIC EXERCISES: CPT

## 2022-12-08 NOTE — PROGRESS NOTES
"      Physical Therapy Daily Treatment Note     Name: Isamar Ibrahim  Clinic Number: [id number]    Therapy Diagnosis:   Encounter Diagnoses   Name Primary?    Decreased strength Yes    Chronic bilateral low back pain without sciatica     Chronic hip pain, bilateral      Physician: Roxie Tanner PA-C    Visit Date: 12/8/2022  Physician Orders: PT Eval and Treat   Medical Diagnosis from Referral:   M70.62 (ICD-10-CM) - Trochanteric bursitis of left hip   M54.32 (ICD-10-CM) - Sciatica of left side      Evaluation Date: 11/3/2022  Authorization Period Expiration: 11/01/23  Plan of Care Expiration: 3/1/23  Progress Note Due: 12/3/22  Visit # / Visits authorized: 2/ 12  FOTO: 60% limitation  FOTO 1st Follow Up:  FOTO 2nd Follow Up:        Precautions: Standard, Diabetes, and Fall      Time In: 9:00  Time Out: 10:00  Total Billable Time: 30 minutes      Subjective     Pt reports: doing better today with less pain in her hip/low back.     She was compliant with home exercise program.  Response to previous treatment: n/a  Functional change: n/a    Pain: 4/10  Location: bilateral back  and hips      Objective     Ambulation: right antalgic gait, with ext rotation, right pelvic drop     Observation: right anterior pelvic rotation    Lumbar range of motion: WFL and pain free    Hip range of motion: decreased hip IR on right     **All exercises billed as therapeutic exercises per medicaid guidelines**    Isamar received therapeutic exercises to develop strength and endurance for 30 minutes including:  Prone glute sets 20x10"  Sidelying hip ER 2x15 each  Partial bridges with glute sets 20x5"  Supine clams with red TB 20x10"  Clams red TB 2x12x5"  Shuttle double leg press 50# 3x15  Paloff press green TB 20x each  NuStep left level 1 for 10' for range of motion, strength and cardiovascular endurance    Isamar received the following manual therapy techniques: Joint mobilizations were applied to the: hip for 10 minutes, " "including:  Hip/lumbar assessment  Sacral ext mobilizations    Not Performed  Right anterior innominate correction MET 3x6"  SI gapping manipulation on right       Home Exercises Provided and Patient Education Provided     Education provided:   - HEP  - importance of hip strengthening    Written Home Exercises Provided: yes.  Exercises were reviewed and Isamar was able to demonstrate them prior to the end of the session.  Isamar demonstrated good  understanding of the education provided.     See EMR under Patient Instructions for exercises provided prior visit.    Assessment      Isamar is doing well and progressing well over the last week. She showed improved range of motion and decreased irritability. Able to progress with core exercises and stability for SI joint and hip. Will continue to progress as tolerated.     Isamar Is progressing well towards her goals.   Pt prognosis is Good.     Pt will continue to benefit from skilled outpatient physical therapy to address the deficits listed in the problem list box on initial evaluation, provide pt/family education and to maximize pt's level of independence in the home and community environment.     Pt's spiritual, cultural and educational needs considered and pt agreeable to plan of care and goals.    Anticipated barriers to physical therapy: scheduling    GOALS: Short Term Goals:  4-6 weeks - progressing not met  1.Report decreased hip pain  < / =  3/10  to increase tolerance for ambulation  2. Increase ROM by 5-10 degrees where limited in order to perform ADLs without difficulty.  3. Increase strength by 1/3 MMT grade in hip  to increase tolerance for ADL and work activities.  4. Pt to tolerate HEP to improve ROM and independence with ADL's     Long Term Goals: 8-12 weeks - progressing not met  1.Report decreased hip pain < / = 2/10  to increase tolerance for ambulation  2.Patient goal: be able to ambulate long distances without pain  3.Increase strength to 4/5 in  " hip  to increase tolerance for ADL and work activities.  4. Pt will report at CJ level (20-40% impaired) on LEFS  to demonstrate increase in LE function with every day tasks.     Plan     Continue with POC and progression as tolerated.     Mayito Birmingham, PT

## 2022-12-13 ENCOUNTER — CLINICAL SUPPORT (OUTPATIENT)
Dept: REHABILITATION | Facility: HOSPITAL | Age: 62
End: 2022-12-13
Payer: MEDICAID

## 2022-12-13 DIAGNOSIS — G89.29 CHRONIC BILATERAL LOW BACK PAIN WITHOUT SCIATICA: ICD-10-CM

## 2022-12-13 DIAGNOSIS — M25.552 CHRONIC HIP PAIN, BILATERAL: ICD-10-CM

## 2022-12-13 DIAGNOSIS — M54.50 CHRONIC BILATERAL LOW BACK PAIN WITHOUT SCIATICA: ICD-10-CM

## 2022-12-13 DIAGNOSIS — R53.1 DECREASED STRENGTH: Primary | ICD-10-CM

## 2022-12-13 DIAGNOSIS — G89.29 CHRONIC HIP PAIN, BILATERAL: ICD-10-CM

## 2022-12-13 DIAGNOSIS — M25.551 CHRONIC HIP PAIN, BILATERAL: ICD-10-CM

## 2022-12-13 PROCEDURE — 97140 MANUAL THERAPY 1/> REGIONS: CPT

## 2022-12-13 PROCEDURE — 97110 THERAPEUTIC EXERCISES: CPT

## 2022-12-13 NOTE — PROGRESS NOTES
"    Physical Therapy Daily Treatment Note     Name: Isamar Ibrahim  Clinic Number: [id number]    Therapy Diagnosis:   Encounter Diagnoses   Name Primary?    Decreased strength Yes    Chronic bilateral low back pain without sciatica     Chronic hip pain, bilateral        Physician: Roxie Tanner PA-C    Visit Date: 12/13/2022  Physician Orders: PT Eval and Treat   Medical Diagnosis from Referral:   M70.62 (ICD-10-CM) - Trochanteric bursitis of left hip   M54.32 (ICD-10-CM) - Sciatica of left side      Evaluation Date: 11/3/2022  Authorization Period Expiration: 11/01/23  Plan of Care Expiration: 3/1/23  Progress Note Due: 12/3/22  Visit # / Visits authorized: 3/ 12  FOTO: 60% limitation  FOTO 1st Follow Up: 54% limitation  FOTO 2nd Follow Up:        Precautions: Standard, Diabetes, and Fall      Time In: 9:00  Time Out: 9:58  Total Billable Time: 30 minutes      Subjective     Pt reports: doing much better, just some stiffness in her back. Has been feeling better with exercises and getting less loss of balance/wobbling in her legs.     She was compliant with home exercise program.  Response to previous treatment: n/a  Functional change: n/a    Pain: 4/10  Location: bilateral back  and hips      Objective     Ambulation: right antalgic gait, with ext rotation, right pelvic drop     Observation: right anterior pelvic rotation    Lumbar range of motion: WFL and pain free    Hip range of motion: decreased hip IR on right     **All exercises billed as therapeutic exercises per medicaid guidelines**    Isamar received therapeutic exercises to develop strength and endurance for 48 minutes including:  Prone glute sets 20x10"  Standing hip ext 2x10 each  Partial bridges with glute sets 20x5"  Supine clams with red TB 20x10"  Clams red TB 2x12x5"  Shuttle double leg press 62# 3x15  Sidelying hip abd 2x6-8 reps  Paloff press green TB 20x each  NuStep left level 1 for 10' for range of motion, strength and cardiovascular " "endurance    Not Performed  Sidelying hip ER 2x15 each    Isamar received the following manual therapy techniques: Joint mobilizations were applied to the: hip for 10 minutes, including:  Hip/lumbar assessment  Sacral ext mobilizations    Not Performed  Right anterior innominate correction MET 3x6"  SI gapping manipulation on right       Home Exercises Provided and Patient Education Provided     Education provided:   - HEP  - importance of hip strengthening    Written Home Exercises Provided: yes.  Exercises were reviewed and Isamar was able to demonstrate them prior to the end of the session.  Isamar demonstrated good  understanding of the education provided.     See EMR under Patient Instructions for exercises provided prior visit.    Assessment      Isamar is doing well and showing improvement with less symptoms and improved gait. She did well today and was able to perform bridges pain free today. Added standing hip ext and sidelying hip abd. Doing well overall and continuing to progress as tolerated.     Isamar Is progressing well towards her goals.   Pt prognosis is Good.     Pt will continue to benefit from skilled outpatient physical therapy to address the deficits listed in the problem list box on initial evaluation, provide pt/family education and to maximize pt's level of independence in the home and community environment.     Pt's spiritual, cultural and educational needs considered and pt agreeable to plan of care and goals.    Anticipated barriers to physical therapy: scheduling    GOALS: Short Term Goals:  4-6 weeks - progressing not met  1.Report decreased hip pain  < / =  3/10  to increase tolerance for ambulation  2. Increase ROM by 5-10 degrees where limited in order to perform ADLs without difficulty.  3. Increase strength by 1/3 MMT grade in hip  to increase tolerance for ADL and work activities.  4. Pt to tolerate HEP to improve ROM and independence with ADL's     Long Term Goals: 8-12 weeks - " progressing not met  1.Report decreased hip pain < / = 2/10  to increase tolerance for ambulation  2.Patient goal: be able to ambulate long distances without pain  3.Increase strength to 4/5 in  hip  to increase tolerance for ADL and work activities.  4. Pt will report at CJ level (20-40% impaired) on LEFS  to demonstrate increase in LE function with every day tasks.     Plan     Continue with POC and progression as tolerated.     Mayito Birmingham, PT

## 2022-12-21 NOTE — PROGRESS NOTES
"    Physical Therapy Daily Treatment Note     Name: Isamar Ibrahim  Clinic Number: [id number]    Therapy Diagnosis:   Encounter Diagnoses   Name Primary?    Decreased strength Yes    Chronic bilateral low back pain without sciatica     Chronic hip pain, bilateral          Physician: Roxie Tanner PA-C    Visit Date: 12/22/2022  Physician Orders: PT Eval and Treat   Medical Diagnosis from Referral:   M70.62 (ICD-10-CM) - Trochanteric bursitis of left hip   M54.32 (ICD-10-CM) - Sciatica of left side      Evaluation Date: 11/3/2022  Authorization Period Expiration: 11/01/23  Plan of Care Expiration: 3/1/23  Progress Note Due: 12/3/22  Visit # / Visits authorized: 5/ 12  FOTO: 60% limitation  FOTO 1st Follow Up: 54% limitation  FOTO 2nd Follow Up:     PTA visit # : 1/5    Time In: 9:00 am   Time Out: 10:00 am  Total Billable Time: 60 minutes    Precautions: Standard, Diabetes, and Fall   Subjective     Pt reports: its better since she's been coming but she still has the usual hip pain only when she's walking which overall is a lot better. Pt stated no pain currently , just stiffness in her hips .     She was compliant with home exercise program.  Response to previous treatment: n/a  Functional change: n/a    Pain: 0/10  Location: bilateral back  and hips      Objective     Ambulation: right antalgic gait, with ext rotation, right pelvic drop     Observation: right anterior pelvic rotation    Lumbar range of motion: WFL and pain free    Hip range of motion: decreased hip IR on right     **All exercises billed as therapeutic exercises per medicaid guidelines**    Isamar received therapeutic exercises to develop strength and endurance for 50 minutes including:    Prone glute sets 20x10"  Standing hip ext 2x10 each  Partial bridges with glute sets 20x5"  Supine clams with red TB 20x10"  Clams red TB 2x12x5"  Shuttle double leg press 62# 3x15  Sidelying hip abd 2x6-8 reps  Paloff press green TB 20x each  NuStep left " "level 1 for 10' for range of motion, strength and cardiovascular endurance    Not Performed  Sidelying hip ER 2x15 each    Isamar received the following manual therapy techniques: Joint mobilizations were applied to the: hip for 10 minutes, including:  Hip/lumbar assessment  Sacral ext mobilizations    Not Performed  Right anterior innominate correction MET 3x6"  SI gapping manipulation on right       Home Exercises Provided and Patient Education Provided     Education provided:   - HEP  - importance of hip strengthening    Written Home Exercises Provided: yes.  Exercises were reviewed and Isamar was able to demonstrate them prior to the end of the session.  Isamar demonstrated good  understanding of the education provided.     See EMR under Patient Instructions for exercises provided prior visit.    Assessment      Pt presents with no pain although c/o stiffness in her hips. Pt with a very good response to sacral extension mobilizations with decreased stiffness and improved comfort reported following. Pt tolerated there-ex as above well today and with no symptom exacerbation reported. She did require frequent cues to perform with slow and controled motions with good pt carryover .   Doing well overall and continuing to progress as tolerated.     Isamar Is progressing well towards her goals.   Pt prognosis is Good.     Pt will continue to benefit from skilled outpatient physical therapy to address the deficits listed in the problem list box on initial evaluation, provide pt/family education and to maximize pt's level of independence in the home and community environment.     Pt's spiritual, cultural and educational needs considered and pt agreeable to plan of care and goals.    Anticipated barriers to physical therapy: scheduling    GOALS: Short Term Goals:  4-6 weeks - progressing not met  1.Report decreased hip pain  < / =  3/10  to increase tolerance for ambulation  2. Increase ROM by 5-10 degrees where limited " in order to perform ADLs without difficulty.  3. Increase strength by 1/3 MMT grade in hip  to increase tolerance for ADL and work activities.  4. Pt to tolerate HEP to improve ROM and independence with ADL's     Long Term Goals: 8-12 weeks - progressing not met  1.Report decreased hip pain < / = 2/10  to increase tolerance for ambulation  2.Patient goal: be able to ambulate long distances without pain  3.Increase strength to 4/5 in  hip  to increase tolerance for ADL and work activities.  4. Pt will report at CJ level (20-40% impaired) on LEFS  to demonstrate increase in LE function with every day tasks.     Plan     Continue with POC and progression as tolerated.     Vicky Wells, PTA

## 2022-12-22 ENCOUNTER — CLINICAL SUPPORT (OUTPATIENT)
Dept: REHABILITATION | Facility: HOSPITAL | Age: 62
End: 2022-12-22
Payer: MEDICAID

## 2022-12-22 DIAGNOSIS — M54.50 CHRONIC BILATERAL LOW BACK PAIN WITHOUT SCIATICA: ICD-10-CM

## 2022-12-22 DIAGNOSIS — M25.551 CHRONIC HIP PAIN, BILATERAL: ICD-10-CM

## 2022-12-22 DIAGNOSIS — G89.29 CHRONIC HIP PAIN, BILATERAL: ICD-10-CM

## 2022-12-22 DIAGNOSIS — R53.1 DECREASED STRENGTH: Primary | ICD-10-CM

## 2022-12-22 DIAGNOSIS — M25.552 CHRONIC HIP PAIN, BILATERAL: ICD-10-CM

## 2022-12-22 DIAGNOSIS — G89.29 CHRONIC BILATERAL LOW BACK PAIN WITHOUT SCIATICA: ICD-10-CM

## 2022-12-22 PROCEDURE — 97110 THERAPEUTIC EXERCISES: CPT | Mod: CQ

## 2023-12-13 ENCOUNTER — OFFICE VISIT (OUTPATIENT)
Dept: ORTHOPEDICS | Facility: CLINIC | Age: 63
End: 2023-12-13
Payer: MEDICARE

## 2023-12-13 VITALS
WEIGHT: 226.63 LBS | HEIGHT: 67 IN | SYSTOLIC BLOOD PRESSURE: 144 MMHG | DIASTOLIC BLOOD PRESSURE: 86 MMHG | HEART RATE: 93 BPM | BODY MASS INDEX: 35.57 KG/M2

## 2023-12-13 DIAGNOSIS — M25.552 LEFT HIP PAIN: ICD-10-CM

## 2023-12-13 DIAGNOSIS — M70.62 TROCHANTERIC BURSITIS OF LEFT HIP: Primary | ICD-10-CM

## 2023-12-13 PROCEDURE — 99999 PR PBB SHADOW E&M-EST. PATIENT-LVL III: CPT | Mod: PBBFAC,,, | Performed by: PHYSICIAN ASSISTANT

## 2023-12-13 PROCEDURE — 1101F PR PT FALLS ASSESS DOC 0-1 FALLS W/OUT INJ PAST YR: ICD-10-PCS | Mod: CPTII,S$GLB,, | Performed by: PHYSICIAN ASSISTANT

## 2023-12-13 PROCEDURE — 3077F PR MOST RECENT SYSTOLIC BLOOD PRESSURE >= 140 MM HG: ICD-10-PCS | Mod: CPTII,S$GLB,, | Performed by: PHYSICIAN ASSISTANT

## 2023-12-13 PROCEDURE — 99213 PR OFFICE/OUTPT VISIT, EST, LEVL III, 20-29 MIN: ICD-10-PCS | Mod: 25,S$GLB,, | Performed by: PHYSICIAN ASSISTANT

## 2023-12-13 PROCEDURE — 4010F PR ACE/ARB THEARPY RXD/TAKEN: ICD-10-PCS | Mod: CPTII,S$GLB,, | Performed by: PHYSICIAN ASSISTANT

## 2023-12-13 PROCEDURE — 20610 DRAIN/INJ JOINT/BURSA W/O US: CPT | Mod: LT,S$GLB,, | Performed by: PHYSICIAN ASSISTANT

## 2023-12-13 PROCEDURE — 3288F PR FALLS RISK ASSESSMENT DOCUMENTED: ICD-10-PCS | Mod: CPTII,S$GLB,, | Performed by: PHYSICIAN ASSISTANT

## 2023-12-13 PROCEDURE — 99213 OFFICE O/P EST LOW 20 MIN: CPT | Mod: 25,S$GLB,, | Performed by: PHYSICIAN ASSISTANT

## 2023-12-13 PROCEDURE — 99999 PR PBB SHADOW E&M-EST. PATIENT-LVL III: ICD-10-PCS | Mod: PBBFAC,,, | Performed by: PHYSICIAN ASSISTANT

## 2023-12-13 PROCEDURE — 1159F PR MEDICATION LIST DOCUMENTED IN MEDICAL RECORD: ICD-10-PCS | Mod: CPTII,S$GLB,, | Performed by: PHYSICIAN ASSISTANT

## 2023-12-13 PROCEDURE — 3077F SYST BP >= 140 MM HG: CPT | Mod: CPTII,S$GLB,, | Performed by: PHYSICIAN ASSISTANT

## 2023-12-13 PROCEDURE — 3008F PR BODY MASS INDEX (BMI) DOCUMENTED: ICD-10-PCS | Mod: CPTII,S$GLB,, | Performed by: PHYSICIAN ASSISTANT

## 2023-12-13 PROCEDURE — 20610 LARGE JOINT ASPIRATION/INJECTION: L GREATER TROCHANTERIC BURSA: ICD-10-PCS | Mod: LT,S$GLB,, | Performed by: PHYSICIAN ASSISTANT

## 2023-12-13 PROCEDURE — 3008F BODY MASS INDEX DOCD: CPT | Mod: CPTII,S$GLB,, | Performed by: PHYSICIAN ASSISTANT

## 2023-12-13 PROCEDURE — 1101F PT FALLS ASSESS-DOCD LE1/YR: CPT | Mod: CPTII,S$GLB,, | Performed by: PHYSICIAN ASSISTANT

## 2023-12-13 PROCEDURE — 3288F FALL RISK ASSESSMENT DOCD: CPT | Mod: CPTII,S$GLB,, | Performed by: PHYSICIAN ASSISTANT

## 2023-12-13 PROCEDURE — 3079F DIAST BP 80-89 MM HG: CPT | Mod: CPTII,S$GLB,, | Performed by: PHYSICIAN ASSISTANT

## 2023-12-13 PROCEDURE — 4010F ACE/ARB THERAPY RXD/TAKEN: CPT | Mod: CPTII,S$GLB,, | Performed by: PHYSICIAN ASSISTANT

## 2023-12-13 PROCEDURE — 3079F PR MOST RECENT DIASTOLIC BLOOD PRESSURE 80-89 MM HG: ICD-10-PCS | Mod: CPTII,S$GLB,, | Performed by: PHYSICIAN ASSISTANT

## 2023-12-13 PROCEDURE — 1159F MED LIST DOCD IN RCRD: CPT | Mod: CPTII,S$GLB,, | Performed by: PHYSICIAN ASSISTANT

## 2023-12-13 RX ORDER — TRIAMCINOLONE ACETONIDE 40 MG/ML
40 INJECTION, SUSPENSION INTRA-ARTICULAR; INTRAMUSCULAR
Status: DISCONTINUED | OUTPATIENT
Start: 2023-12-13 | End: 2023-12-13 | Stop reason: HOSPADM

## 2023-12-13 RX ADMIN — TRIAMCINOLONE ACETONIDE 40 MG: 40 INJECTION, SUSPENSION INTRA-ARTICULAR; INTRAMUSCULAR at 09:12

## 2023-12-13 NOTE — PROCEDURES
Large Joint Aspiration/Injection: L greater trochanteric bursa    Date/Time: 12/13/2023 9:30 AM    Performed by: Roxie Tanner PA-C  Authorized by: Roxie Tanner PA-C    Consent Done?:  Yes (Verbal)  Indications:  Pain  Site marked: the procedure site was marked    Timeout: prior to procedure the correct patient, procedure, and site was verified    Prep: patient was prepped and draped in usual sterile fashion      Local anesthesia used?: Yes    Local anesthetic:  Topical anesthetic    Details:  Needle Size:  22 G  Approach:  Lateral  Location:  Hip  Site:  L greater trochanteric bursa  Medications:  40 mg triamcinolone acetonide 40 mg/mL  Patient tolerance:  Patient tolerated the procedure well with no immediate complications

## 2023-12-13 NOTE — PROGRESS NOTES
Subjective:      Patient ID: Isamar Ibrahim is a 63 y.o. female.    Chief Complaint: Pain of the Left Hip      64yo F follow up left hip pain. Patient states her pain is the same as last visit which was November 2022. Pain is laterally. Worse with ambulation. States pain is causing her difficulty with gait and balance. This is causing her sciatic pain to flare which radiates down her leg. Last bursa injection resolved hip and leg pain. She did try PT which helped.         Review of Systems   Constitutional: Negative for chills and fever.   Cardiovascular:  Negative for chest pain.   Respiratory:  Negative for cough.    Hematologic/Lymphatic: Does not bruise/bleed easily.   Skin:  Negative for poor wound healing and rash.   Musculoskeletal:  Positive for back pain, joint pain, myalgias and stiffness.   Gastrointestinal:  Negative for abdominal pain.   Genitourinary:  Negative for bladder incontinence.   Neurological:  Negative for dizziness, loss of balance and weakness.   Psychiatric/Behavioral:  Negative for altered mental status.        Review of patient's allergies indicates:   Allergen Reactions    Penicillins Shortness Of Breath        Current Outpatient Medications   Medication Sig Dispense Refill    amlodipine (NORVASC) 10 MG tablet Take 10 mg by mouth once daily.  3    atorvastatin (LIPITOR) 40 MG tablet Take 40 mg by mouth once daily.  3    CALCIUM-MAGNESIUM-ZINC ORAL Take by mouth.      carvedilol (COREG) 25 MG tablet Take 25 mg by mouth 2 (two) times daily with meals.  3    HUMALOG 100 unit/mL injection INJECT 5 UNITS SC BID BEFORE MEALS  11    LACTOBACILLUS ACIDOPHILUS (PROBIOTIC ORAL) Take 1 capsule by mouth once daily.      LEVEMIR FLEXTOUCH 100 unit/mL (3 mL) InPn pen INJECT 70 UNITS SC AT NIGHT  10    lisinopril (PRINIVIL,ZESTRIL) 40 MG tablet Take 40 mg by mouth once daily.  3    loratadine (CLARITIN) 10 mg tablet Take 10 mg by mouth once daily.      metformin (GLUCOPHAGE) 1000 MG tablet Take  "1,000 mg by mouth 2 (two) times daily.  3    NEXIUM 40 mg capsule TK 1 C PO QAM B ABEBE  3    nicotine (NICODERM CQ) 21 mg/24 hr Place 1 patch onto the skin once daily. 28 patch 0    nicotine 10 mg/mL Spry One dose (one spray to each nostril) as needed , max 5 in 1 hour period. 40 mL 0    nicotine polacrilex 4 MG Lozg Nicorette Lozenge -One 4 mg lozenge by mouth as needed, max 5 in 6 hour period. 168 lozenge 0    vitamin E 400 UNIT capsule Take 400 Units by mouth 2 (two) times daily.       No current facility-administered medications for this visit.        The patient's relevant past medical, surgical, and social history was reviewed in Epic.       Objective:      VITAL SIGNS: BP (!) 144/86   Pulse 93   Ht 5' 7" (1.702 m)   Wt 102.8 kg (226 lb 10.1 oz)   BMI 35.50 kg/m²     General    Nursing note and vitals reviewed.  Constitutional: She is oriented to person, place, and time. She appears well-developed and well-nourished.   Neurological: She is alert and oriented to person, place, and time.     General Musculoskeletal Exam   Gait: antalgic     Left Hip Exam     Tenderness   The patient tender to palpation of the trochanteric bursa.    Range of Motion   Extension:  normal   Flexion:  normal   External rotation:  normal   Internal rotation: normal     Tests   Pain w/ forced internal rotation (CRYSTAL): absent  Pain w/ forced external rotation (FADIR): absent  Stinchfield test: negative    Other   Sensation: normal    Comments:  Negative SLR           Muscle Strength   Left Lower Extremity   Hip Abduction: 5/5   Hip Adduction: 5/5   Hip Flexion: 5/5   Ankle Dorsiflexion:  5/5            Assessment:       1. Trochanteric bursitis of left hip    2. Left hip pain          Plan:         Isamar was seen today for pain.    Diagnoses and all orders for this visit:    Trochanteric bursitis of left hip  -     Large Joint Aspiration/Injection: L greater trochanteric bursa    Left hip pain  -     Large Joint " Aspiration/Injection: L greater trochanteric bursa    Will repeat left hip greater troch bursa injection since this helped for one year. If she still has sciatic pain, I will have to refer her to spine specialist. She understands.    Diagnoses and plan discussed with the patient, as well as the expected course and duration of his symptoms.  All questions and concerns were addressed prior to the end of the visit.   Instructed patient to call office if they have any future questions/concerns or to schedule apt. Patient will return to see me if symptoms worsen or fail to improve    Note dictated with voice recognition software, please excuse any grammatical errors.        Roxie Tanner PA-C   12/13/2023

## 2025-04-02 ENCOUNTER — TELEPHONE (OUTPATIENT)
Dept: ORTHOPEDICS | Facility: CLINIC | Age: 65
End: 2025-04-02
Payer: MEDICARE

## 2025-04-02 NOTE — TELEPHONE ENCOUNTER
----- Message from León sent at 4/2/2025  1:36 PM CDT -----  Pt Requesting to Schedule an Appointment Pt is requesting to schedule an appointment that our scheduling dept cannot schedule.Who called: Tashiaest call back #:  [phone] pt wants appt: left hip injectionReason for appt:Additional notes: pt prefers to have this appt this week if any date (except for 4/8/25)

## 2025-04-04 ENCOUNTER — TELEPHONE (OUTPATIENT)
Dept: ORTHOPEDICS | Facility: CLINIC | Age: 65
End: 2025-04-04
Payer: MEDICARE

## 2025-04-04 DIAGNOSIS — M25.552 LEFT HIP PAIN: Primary | ICD-10-CM

## 2025-04-04 NOTE — TELEPHONE ENCOUNTER
LVM for patient that her insurance is out of network.  She may want to cancel her appointment.  She will receive a bill for this visit and xrays.